# Patient Record
Sex: MALE | Race: WHITE | NOT HISPANIC OR LATINO | URBAN - METROPOLITAN AREA
[De-identification: names, ages, dates, MRNs, and addresses within clinical notes are randomized per-mention and may not be internally consistent; named-entity substitution may affect disease eponyms.]

---

## 2019-11-11 ENCOUNTER — EMERGENCY (EMERGENCY)
Facility: HOSPITAL | Age: 24
LOS: 0 days | Discharge: HOME | End: 2019-11-11
Attending: EMERGENCY MEDICINE | Admitting: EMERGENCY MEDICINE
Payer: MEDICAID

## 2019-11-11 VITALS
TEMPERATURE: 98 F | RESPIRATION RATE: 18 BRPM | HEART RATE: 85 BPM | SYSTOLIC BLOOD PRESSURE: 141 MMHG | DIASTOLIC BLOOD PRESSURE: 82 MMHG | OXYGEN SATURATION: 100 %

## 2019-11-11 VITALS
RESPIRATION RATE: 18 BRPM | DIASTOLIC BLOOD PRESSURE: 78 MMHG | SYSTOLIC BLOOD PRESSURE: 144 MMHG | HEART RATE: 95 BPM | TEMPERATURE: 98 F | WEIGHT: 190.04 LBS | OXYGEN SATURATION: 100 %
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
## 2019-11-11 DIAGNOSIS — S91.312A LACERATION WITHOUT FOREIGN BODY, LEFT FOOT, INITIAL ENCOUNTER: ICD-10-CM

## 2019-11-11 DIAGNOSIS — R20.0 ANESTHESIA OF SKIN: ICD-10-CM

## 2019-11-11 PROCEDURE — 73620 X-RAY EXAM OF FOOT: CPT | Mod: 26,LT

## 2019-11-11 PROCEDURE — 99283 EMERGENCY DEPT VISIT LOW MDM: CPT

## 2019-11-11 PROCEDURE — 73630 X-RAY EXAM OF FOOT: CPT | Mod: 26,LT

## 2019-11-11 RX ORDER — TETANUS TOXOID, REDUCED DIPHTHERIA TOXOID AND ACELLULAR PERTUSSIS VACCINE, ADSORBED 5; 2.5; 8; 8; 2.5 [IU]/.5ML; [IU]/.5ML; UG/.5ML; UG/.5ML; UG/.5ML
0.5 SUSPENSION INTRAMUSCULAR ONCE
Refills: 0 | Status: COMPLETED | OUTPATIENT
Start: 2019-11-11 | End: 2019-11-11

## 2019-11-11 RX ORDER — CEPHALEXIN 500 MG
1 CAPSULE ORAL
Qty: 28 | Refills: 0
Start: 2019-11-11 | End: 2019-11-17

## 2019-11-11 RX ADMIN — TETANUS TOXOID, REDUCED DIPHTHERIA TOXOID AND ACELLULAR PERTUSSIS VACCINE, ADSORBED 0.5 MILLILITER(S): 5; 2.5; 8; 8; 2.5 SUSPENSION INTRAMUSCULAR at 11:27

## 2019-11-11 NOTE — ED PROVIDER NOTE - OBJECTIVE STATEMENT
patient c/o laceration to left lateral foot. Glass bottle fell breaking on foot last night, C/o laceration with numbness to foot distal to laceration.

## 2019-11-11 NOTE — ED PROVIDER NOTE - PATIENT PORTAL LINK FT
You can access the FollowMyHealth Patient Portal offered by [facility] by registering at the following website: [email or web address]. By joining [employer]’s FollowMyHealth portal, you will also be able to view your health information using other applications (apps) compatible with our system.

## 2019-11-11 NOTE — ED PROVIDER NOTE - CARE PROVIDER_API CALL
Moe Mcintyre (DPM)  [facility]  [address], 1st [address]  [place] [address]  Phone: [phone]  Fax: [phone]  Follow Up Time:

## 2019-11-11 NOTE — ED PROVIDER NOTE - DISCHARGE DATE
Called pt left message to return call.  Last office visit with Dr. Leija was on 08-01-16 at which time Dr. Leija advised Tequila to return to clinic for f/u in 3 months.    Mira House  [facility] RN  
Msg over 1 week.  Has not called back to make any follow up appt.  Will close request. Can recreate encounter if patient call to make appt.  Qian Rodriguez RN  [facility]      
Propranolol        Last Written Prescription Date: 03-17-17  Last Fill Quantity: 60, # refills: 5    Last Office Visit with G, P or [facility] prescribing provider:  08-01-16   Future Office Visit:        BP Readings from Last 3 Encounters:   05/22/17 109/77   01/17/17 108/71   11/30/16 108/57     Mira House  [facility] RN  
Pt notified of below.  Pt reports understanding.  Pt does not have further questions or concerns.  Patient will check her schedule and call back for an appointment.  Patient will notify care team when appointment is scheduled so refill can be generated.    Makayla Min   [facility]  RN    
Requesting refill of Propranolol 60 mg # 60  
11-Nov-2019

## 2019-11-11 NOTE — ED PROVIDER NOTE - ATTENDING CONTRIBUTION TO CARE
lac to lateral foot.  wound is overlying the sural nerve.  VS noted.  there is sensory deficit distal to the wound on the dorsum and lateral aspect of the foot.  Xray reviewed.  No fb seen. closure supervised.  d/w podiatry.

## 2019-11-11 NOTE — ED PROVIDER NOTE - CLINICAL SUMMARY MEDICAL DECISION MAKING FREE TEXT BOX
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  d/w podiatry: advised primary wound closure.  f/u arranged.

## 2019-11-11 NOTE — ED PROVIDER NOTE - MUSCULOSKELETAL MINIMAL EXAM
left foot shows 1 cm laceration to lateral aspect at base 5th metatarsal.   Numbness noted to lateral foot extending to 5th toe, FROM, no weakness.

## 2019-11-25 ENCOUNTER — EMERGENCY (EMERGENCY)
Facility: HOSPITAL | Age: 24
LOS: 0 days | Discharge: HOME | End: 2019-11-25
Attending: EMERGENCY MEDICINE | Admitting: EMERGENCY MEDICINE

## 2019-11-25 VITALS
OXYGEN SATURATION: 99 % | WEIGHT: 199.96 LBS | RESPIRATION RATE: 18 BRPM | HEIGHT: 72 IN | HEART RATE: 89 BPM | SYSTOLIC BLOOD PRESSURE: 163 MMHG | TEMPERATURE: 98 F | DIASTOLIC BLOOD PRESSURE: 67 MMHG
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
## 2019-11-25 DIAGNOSIS — S91.312D LACERATION WITHOUT FOREIGN BODY, LEFT FOOT, SUBSEQUENT ENCOUNTER: ICD-10-CM

## 2019-11-25 PROBLEM — Z78.9 OTHER SPECIFIED HEALTH STATUS: Chronic | Status: ACTIVE | Noted: 2019-11-11

## 2019-11-25 NOTE — ED PROVIDER NOTE - OBJECTIVE STATEMENT
25 yo male, no pmh, presents to ed for suture removal. pt states 2 weeks ago sustained lac to left lateral foot from beer glass. No complications, mild. Denies fever, drainage, redness, pain, limited rom, numbness, tingling.

## 2019-11-25 NOTE — ED ADULT NURSE NOTE - NSIMPLEMENTINTERV_GEN_ALL_ED
Implemented All Universal Safety Interventions:  [place] to call system. Call bell, personal items and telephone within reach. Instruct patient to call for assistance. Room bathroom lighting operational. Non-slip footwear when patient is off stretcher. Physically safe environment: no spills, clutter or unnecessary equipment. Stretcher in lowest position, wheels locked, appropriate side rails in place.

## 2019-11-25 NOTE — ED PROVIDER NOTE - NS ED ROS FT
Constitutional: (-) fever  Musculoskeletal: (-) limited rom  Integumentary: (-) rash  Neurological: (-) tingling, (-)numbness,

## 2019-11-25 NOTE — ED PROVIDER NOTE - PHYSICAL EXAMINATION
Physical Exam    Vital Signs: I have reviewed the initial vital signs.  Constitutional: well-nourished, appears stated age, no acute distress  Eyes: Conjunctiva pink, Sclera clear  Musculoskeletal: from of left ankle, no ttp over suture site  Integumentary: wound intact, sutures in placed, healed well  Neurologic: awake, alert, nvi

## 2019-11-25 NOTE — ED PROVIDER NOTE - PATIENT PORTAL LINK FT
You can access the FollowMyHealth Patient Portal offered by [facility] by registering at the following website: [email or web address]. By joining [employer]’s FollowMyHealth portal, you will also be able to view your health information using other applications (apps) compatible with our system.

## 2022-05-15 NOTE — ED ADULT NURSE NOTE - CAS EDN DISCHARGE ASSESSMENT
05/15/22                            Benitez Clay  [id number] [facility] [id number]    To Whom It May Concern:    This is to certify Benitez Clay was evaluated with ZACHARY Juarez on 05/15/22 and can return to regular work on 5/18/20222, or sooner if better.     RESTRICTIONS: none              ZACHARY Juarez  [address] [address]  Phone: [phone]  Fax: [phone]    
Alert and oriented to person, place and time

## 2024-01-01 NOTE — ED PROVIDER NOTE - PRINCIPAL DIAGNOSIS
Tu prueba de embarazo es positiva. Es necesario realizar un seguimiento con un obstetra.  
Visit for suture removal
AAO1 person

## 2025-07-07 ENCOUNTER — INPATIENT (INPATIENT)
Facility: HOSPITAL | Age: 30
LOS: 0 days | Discharge: HOME | DRG: 58 | End: 2025-07-08
Admitting: STUDENT IN AN ORGANIZED HEALTH CARE EDUCATION/TRAINING PROGRAM
Payer: MEDICAID

## 2025-07-07 VITALS
DIASTOLIC BLOOD PRESSURE: 91 MMHG | SYSTOLIC BLOOD PRESSURE: 141 MMHG | TEMPERATURE: 99 F | OXYGEN SATURATION: 99 % | HEART RATE: 89 BPM | RESPIRATION RATE: 20 BRPM

## 2025-07-07 DIAGNOSIS — R93.0 ABNORMAL FINDINGS ON DIAGNOSTIC IMAGING OF SKULL AND HEAD, NOT ELSEWHERE CLASSIFIED: ICD-10-CM

## 2025-07-07 LAB
ALBUMIN SERPL ELPH-MCNC: 4.8 G/DL — SIGNIFICANT CHANGE UP (ref 3.5–5.2)
ALP SERPL-CCNC: 96 U/L — SIGNIFICANT CHANGE UP (ref 30–115)
ALT FLD-CCNC: 25 U/L — SIGNIFICANT CHANGE UP (ref 0–41)
ANION GAP SERPL CALC-SCNC: 15 MMOL/L — HIGH (ref 7–14)
AST SERPL-CCNC: 26 U/L — SIGNIFICANT CHANGE UP (ref 0–41)
BASOPHILS # BLD AUTO: 0.06 K/UL — SIGNIFICANT CHANGE UP (ref 0–0.2)
BASOPHILS NFR BLD AUTO: 1.1 % — SIGNIFICANT CHANGE UP (ref 0–2)
BILIRUB SERPL-MCNC: 0.9 MG/DL — SIGNIFICANT CHANGE UP (ref 0.2–1.2)
BUN SERPL-MCNC: 15 MG/DL — SIGNIFICANT CHANGE UP (ref 10–20)
CALCIUM SERPL-MCNC: 9.5 MG/DL — SIGNIFICANT CHANGE UP (ref 8.4–10.5)
CHLORIDE SERPL-SCNC: 100 MMOL/L — SIGNIFICANT CHANGE UP (ref 98–110)
CHOLEST SERPL-MCNC: 205 MG/DL — HIGH
CO2 SERPL-SCNC: 22 MMOL/L — SIGNIFICANT CHANGE UP (ref 17–32)
CREAT SERPL-MCNC: 1.1 MG/DL — SIGNIFICANT CHANGE UP (ref 0.7–1.5)
EGFR: 92 ML/MIN/1.73M2 — SIGNIFICANT CHANGE UP
EGFR: 92 ML/MIN/1.73M2 — SIGNIFICANT CHANGE UP
EOSINOPHIL # BLD AUTO: 0.24 K/UL — SIGNIFICANT CHANGE UP (ref 0–0.5)
EOSINOPHIL NFR BLD AUTO: 4.2 % — SIGNIFICANT CHANGE UP (ref 0–6)
GLUCOSE SERPL-MCNC: 102 MG/DL — HIGH (ref 70–99)
HCT VFR BLD CALC: 45.3 % — SIGNIFICANT CHANGE UP (ref 39–50)
HDLC SERPL-MCNC: 42 MG/DL — SIGNIFICANT CHANGE UP
HGB BLD-MCNC: 15.3 G/DL — SIGNIFICANT CHANGE UP (ref 13–17)
IMM GRANULOCYTES # BLD AUTO: 0.02 K/UL — SIGNIFICANT CHANGE UP (ref 0–0.07)
IMM GRANULOCYTES NFR BLD AUTO: 0.4 % — SIGNIFICANT CHANGE UP (ref 0–0.9)
LDLC SERPL-MCNC: 126 MG/DL — HIGH
LIPID PNL WITH DIRECT LDL SERPL: 126 MG/DL — HIGH
LYMPHOCYTES # BLD AUTO: 1.55 K/UL — SIGNIFICANT CHANGE UP (ref 1–3.3)
LYMPHOCYTES NFR BLD AUTO: 27.1 % — SIGNIFICANT CHANGE UP (ref 13–44)
MCHC RBC-ENTMCNC: 29.9 PG — SIGNIFICANT CHANGE UP (ref 27–34)
MCHC RBC-ENTMCNC: 33.8 G/DL — SIGNIFICANT CHANGE UP (ref 32–36)
MCV RBC AUTO: 88.5 FL — SIGNIFICANT CHANGE UP (ref 80–100)
MONOCYTES # BLD AUTO: 0.63 K/UL — SIGNIFICANT CHANGE UP (ref 0–0.9)
MONOCYTES NFR BLD AUTO: 11 % — SIGNIFICANT CHANGE UP (ref 2–14)
NEUTROPHILS # BLD AUTO: 3.21 K/UL — SIGNIFICANT CHANGE UP (ref 1.8–7.4)
NEUTROPHILS NFR BLD AUTO: 56.2 % — SIGNIFICANT CHANGE UP (ref 43–77)
NONHDLC SERPL-MCNC: 163 MG/DL — HIGH
NRBC # BLD AUTO: 0 K/UL — SIGNIFICANT CHANGE UP (ref 0–0)
NRBC # FLD: 0 K/UL — SIGNIFICANT CHANGE UP (ref 0–0)
NRBC BLD AUTO-RTO: 0 /100 WBCS — SIGNIFICANT CHANGE UP (ref 0–0)
PLATELET # BLD AUTO: 341 K/UL — SIGNIFICANT CHANGE UP (ref 150–400)
PMV BLD: 9.1 FL — SIGNIFICANT CHANGE UP (ref 7–13)
POTASSIUM SERPL-MCNC: 4.5 MMOL/L — SIGNIFICANT CHANGE UP (ref 3.5–5)
POTASSIUM SERPL-SCNC: 4.5 MMOL/L — SIGNIFICANT CHANGE UP (ref 3.5–5)
PROT SERPL-MCNC: 7.4 G/DL — SIGNIFICANT CHANGE UP (ref 6–8)
RBC # BLD: 5.12 M/UL — SIGNIFICANT CHANGE UP (ref 4.2–5.8)
RBC # FLD: 12 % — SIGNIFICANT CHANGE UP (ref 10.3–14.5)
SODIUM SERPL-SCNC: 137 MMOL/L — SIGNIFICANT CHANGE UP (ref 135–146)
TRIGL SERPL-MCNC: 208 MG/DL — HIGH
WBC # BLD: 5.71 K/UL — SIGNIFICANT CHANGE UP (ref 3.8–10.5)
WBC # FLD AUTO: 5.71 K/UL — SIGNIFICANT CHANGE UP (ref 3.8–10.5)

## 2025-07-07 PROCEDURE — A9579: CPT

## 2025-07-07 PROCEDURE — 70551 MRI BRAIN STEM W/O DYE: CPT

## 2025-07-07 PROCEDURE — 70498 CT ANGIOGRAPHY NECK: CPT | Mod: 26

## 2025-07-07 PROCEDURE — 99285 EMERGENCY DEPT VISIT HI MDM: CPT

## 2025-07-07 PROCEDURE — 70450 CT HEAD/BRAIN W/O DYE: CPT | Mod: 26,59

## 2025-07-07 PROCEDURE — 70551 MRI BRAIN STEM W/O DYE: CPT | Mod: 26,59

## 2025-07-07 PROCEDURE — 70496 CT ANGIOGRAPHY HEAD: CPT | Mod: 26

## 2025-07-07 PROCEDURE — 70546 MR ANGIOGRAPH HEAD W/O&W/DYE: CPT | Mod: 26

## 2025-07-07 PROCEDURE — 36415 COLL VENOUS BLD VENIPUNCTURE: CPT

## 2025-07-07 PROCEDURE — 93306 TTE W/DOPPLER COMPLETE: CPT

## 2025-07-07 PROCEDURE — 70546 MR ANGIOGRAPH HEAD W/O&W/DYE: CPT

## 2025-07-07 RX ORDER — KETOROLAC TROMETHAMINE 30 MG/ML
15 INJECTION, SOLUTION INTRAMUSCULAR; INTRAVENOUS EVERY 6 HOURS
Refills: 0 | Status: DISCONTINUED | OUTPATIENT
Start: 2025-07-07 | End: 2025-07-08

## 2025-07-07 RX ORDER — ACETAMINOPHEN 500 MG/5ML
650 LIQUID (ML) ORAL EVERY 6 HOURS
Refills: 0 | Status: DISCONTINUED | OUTPATIENT
Start: 2025-07-07 | End: 2025-07-08

## 2025-07-07 RX ORDER — METOCLOPRAMIDE HCL 10 MG
10 TABLET ORAL EVERY 6 HOURS
Refills: 0 | Status: DISCONTINUED | OUTPATIENT
Start: 2025-07-07 | End: 2025-07-08

## 2025-07-07 RX ORDER — ONDANSETRON HCL/PF 4 MG/2 ML
4 VIAL (ML) INJECTION EVERY 8 HOURS
Refills: 0 | Status: DISCONTINUED | OUTPATIENT
Start: 2025-07-07 | End: 2025-07-08

## 2025-07-07 RX ORDER — MAGNESIUM, ALUMINUM HYDROXIDE 200-200 MG
30 TABLET,CHEWABLE ORAL EVERY 4 HOURS
Refills: 0 | Status: DISCONTINUED | OUTPATIENT
Start: 2025-07-07 | End: 2025-07-08

## 2025-07-07 RX ORDER — MELATONIN 5 MG
3 TABLET ORAL AT BEDTIME
Refills: 0 | Status: DISCONTINUED | OUTPATIENT
Start: 2025-07-07 | End: 2025-07-08

## 2025-07-07 NOTE — H&P ADULT - NSHPPHYSICALEXAM_GEN_ALL_CORE
VITALS:   T(C): 37.2 (07-07-25 @ 10:32), Max: 37.2 (07-07-25 @ 10:32)  HR: 64 (07-07-25 @ 15:04) (64 - 89)  BP: 143/77 (07-07-25 @ 15:04) (141/91 - 143/77)  RR: 20 (07-07-25 @ 15:04) (20 - 20)  SpO2: 100% (07-07-25 @ 15:04) (99% - 100%)    GENERAL: NAD, lying in bed comfortably  HEAD:  Atraumatic, Normocephalic  EYES: EOMI, PERRLA, conjunctiva and sclera clear  ENT: Moist mucous membranes  NECK: Supple, No JVD  CHEST/LUNG: Clear to auscultation bilaterally; No rales, rhonchi, wheezing, or rubs. Unlabored respirations  HEART: Regular rate and rhythm; No murmurs, rubs, or gallops  ABDOMEN: BSx4; Soft, nontender, nondistended  EXTREMITIES:  2+ Peripheral Pulses, brisk capillary refill. No clubbing, cyanosis, or edema  NERVOUS SYSTEM:  A&Ox3, no focal deficits   SKIN: No rashes or lesions VITALS:   T(C): 37.2 (07-07-25 @ 10:32), Max: 37.2 (07-07-25 @ 10:32)  HR: 64 (07-07-25 @ 15:04) (64 - 89)  BP: 143/77 (07-07-25 @ 15:04) (141/91 - 143/77)  RR: 20 (07-07-25 @ 15:04) (20 - 20)  SpO2: 100% (07-07-25 @ 15:04) (99% - 100%)    GENERAL: NAD, lying in bed comfortably  HEAD:  Atraumatic, Normocephalic  EYES: EOMI, PERRLA, conjunctiva and sclera clear  ENT: Moist mucous membranes  NECK: Supple, No JVD  CHEST/LUNG: Clear to auscultation bilaterally; No rales, rhonchi, wheezing, or rubs. Unlabored respirations  HEART: slight systolic murmur otherwise Regular rate and rhythm; No rubs, or gallops  ABDOMEN: BSx4; Soft, nontender, nondistended  EXTREMITIES:  2+ Peripheral Pulses, brisk capillary refill. No clubbing, cyanosis, or edema  NERVOUS SYSTEM:  A&Ox3, no focal deficits   SKIN: No rashes or lesions

## 2025-07-07 NOTE — H&P ADULT - NSHPLABSRESULTS_GEN_ALL_CORE
15.3   5.71  )-----------( 341      ( 07 Jul 2025 10:58 )             45.3   07-07    137  |  100  |  15  ----------------------------<  102[H]  4.5   |  22  |  1.1    Ca    9.5      07 Jul 2025 10:58    TPro  7.4  /  Alb  4.8  /  TBili  0.9  /  DBili  x   /  AST  26  /  ALT  25  /  AlkPhos  96  07-07      CT BRAIN:    There is no evidence for acute intracranial hemorrhage, mass effect or   midline shift.    The basal cisterns are patent. There is no hydrocephalus.    There is no extra-axial collection.    The visualized orbits are unremarkable.    The calvarium is intact, without depressed fracture.    The visualized paranasal sinuses and mastoid air cells are clear.    HEAD CTA:    The internal carotid arteries demonstrate normal enhancement to the   intracranial circulation and [place].    There is moderate focal stenosis noted of the proximal right A2 segment   of the right MARYAM, series 605 image 71. The distal ACAs appear patent.    The middle cerebral arteries demonstrate normal enhancement and symmetric   arborization without intraluminal filling defect, stenosis, occlusion,   aneurysm or vascular malformation.    The intradural vertebral arteries demonstrate normal enhancement to the   vertebrobasilar confluence. Branch vasculature of the posterior   circulation are within normal limits. The posterior cerebral arteries   demonstrate symmetric enhancement and arborization without evidence for   aneurysm, stenosis, occlusion or vascular malformation.    Visualized dural venous sinuses and deep cerebral venous structures   demonstrate normal enhancement without evidence for filling defect.    NECK CTA:    The aortic arch and origins of the great vessels are within normal limits.    Right:  The origin of the right common carotid artery is normal. The   right common carotid artery is normal in course and caliber to the  carotid bifurcation. Origins of the internal and external carotid   arteries are normal by NASCET criteria. The right internal carotid artery   has normal course and caliber to the intracranial circulation.    The origin of the right vertebral artery is normal. The right vertebral   artery is normal in course and caliber to the intracranial circulation.    Left: The origin of the left common carotid artery is normal. The left   common carotid artery is normal in course and caliber to the carotid   bifurcation. Origins of the internal and external carotid arteries are   normal by NASCET criteria. The left internal carotid artery has normal   course and caliber to the intracranial circulation.    The origin of the left vertebral artery is normal. The left vertebral   artery is normal in course and caliber to the intracranial circulation.    IMPRESSION:  CT HEAD:  No acute intracranial pathology. No evidence of midline shift, mass   effect or intracranial hemorrhage.    CTA HEAD:  Moderatefocal stenosis of the proximal right A2 segment of the MARYAM.    Intracranial vessels are otherwise patent and unremarkable.    CTA NECK:  No evidence of carotid or vertebral artery stenosis.    --- End of Report ---

## 2025-07-07 NOTE — PATIENT PROFILE ADULT - FALL HARM RISK - UNIVERSAL INTERVENTIONS
Bed in lowest position, wheels locked, appropriate side rails in place/Call bell, personal items and telephone in reach/Instruct patient to call for assistance before getting out of bed or chair/Non-slip footwear when patient is out of bed/[place] to call system/Physically safe environment - no spills, clutter or unnecessary equipment/Purposeful Proactive Rounding/Room/bathroom lighting operational, light cord in reach

## 2025-07-07 NOTE — ED PROVIDER NOTE - OBJECTIVE STATEMENT
29-year-old male with no past medical history presenting for evaluation of an exertional headache that has been occurring over the past 1 week.  Patient also describes being in a "fog".  No dizziness, or visual changes, paresthesias, weakness, fevers or chills.

## 2025-07-07 NOTE — ED ADULT NURSE NOTE - NSFALLUNIVINTERV_ED_ALL_ED
Bed/Stretcher in lowest position, wheels locked, appropriate side rails in place/Call bell, personal items and telephone in reach/Instruct patient to call for assistance before getting out of bed/chair/stretcher/Non-slip footwear applied when patient is off stretcher/[place] to call system/Physically safe environment - no spills, clutter or unnecessary equipment/Purposeful proactive rounding/Room/bathroom lighting operational, light cord in reach

## 2025-07-07 NOTE — H&P ADULT - ASSESSMENT
#Headache  #Focal stenosis of right A2 segment of MARYAM  - Admit to med/surg  - Neurology consult  - F/up MRI Brain   - Tylenol, toradol, reglan prn for headahce symptoms  30yo M with no significant PMHx who presents with an exertional headache x 1 week. Unclear etiology of symptoms given exertional nature. No obvious medication side effects and denies recreational drug use. No history or family history to explain symptoms. Will eval further with MR/MRA imaging and consult with neurology    #Headache  #Focal stenosis of right A2 segment of MARYAM  - Admit to med/surg  - Neurology consult  - F/up MRI Brain w/o and MRA Head w/w/o  - Tylenol, toradol, reglan prn for headahce symptoms  28yo M with no significant PMHx who presents with an exertional headache x 1 week. Unclear etiology of symptoms given exertional nature. No obvious medication side effects and denies recreational drug use. No history or family history to explain symptoms. Will eval further with MR/MRA imaging and consult with neurology    #Headache  #Focal stenosis of right A2 segment of MARYAM  - Admit to med/surg  - Neurology consult  - F/up MRI Brain w/o and MRA Head w/w/o  - Check lipid panel, A1c, TSH  - Tylenol, toradol, reglan prn for headahce symptoms  30yo M with no significant PMHx who presents with an exertional headache x 1 week. Unclear etiology of symptoms given exertional nature. No obvious medication side effects and denies recreational drug use. No history or family history to explain symptoms. Will eval further with MR/MRA imaging and consult with neurology    #Headache  #Focal stenosis of right A2 segment of MARYAM  - Admit to med/surg  - Neurology consult  - F/up MRI Brain w/o and MRA Head w/w/o  - Check lipid panel, A1c, TSH  - Obtain TTE  - Tylenol, toradol, reglan prn for headahce symptoms

## 2025-07-07 NOTE — H&P ADULT - HISTORY OF PRESENT ILLNESS
28yo M with no significant PMHx who presents with an exertional headache x 1 week.  30yo M with no significant PMHx who presents with an exertional headache x 1 week. Patient reports new onset of frontal headache only when exercising. Headache has been associated with nausea, and "fuzziness" in his head. Has never experienced symptoms like these in the past. Does not have headache at rest. No other focal neurological symptoms. Denies vision changes, hearing changes, dizziness, lightheadedness, weakness, numbness, tingling, chest pain, palpitations. States he is very active. No prescribed moods, only supplements (B12, creatine, collagen, element T, fish oil). States he was told he had high cholesterol in the past. No family history of similar symptoms. Denies alcohol, tobacco or drug use.

## 2025-07-07 NOTE — ED PROVIDER NOTE - CLINICAL SUMMARY MEDICAL DECISION MAKING FREE TEXT BOX
29-year-old male here for evaluation of exertional headache for the past 2 days no focal weakness numbness tingling.  Here on exam patient no distress current nerves II through XII intact motor and sensation grossly intact normal gait.  Here CT imaging shows moderate focal stenosis of the right A2 segment of the MARYAM.  Neurology was consulted and recommended MRI of the brain.

## 2025-07-08 ENCOUNTER — TRANSCRIPTION ENCOUNTER (OUTPATIENT)
Age: 30
End: 2025-07-08

## 2025-07-08 ENCOUNTER — RESULT REVIEW (OUTPATIENT)
Age: 30
End: 2025-07-08

## 2025-07-08 VITALS
SYSTOLIC BLOOD PRESSURE: 123 MMHG | RESPIRATION RATE: 18 BRPM | DIASTOLIC BLOOD PRESSURE: 66 MMHG | HEART RATE: 62 BPM | OXYGEN SATURATION: 99 % | TEMPERATURE: 98 F

## 2025-07-08 LAB
A1C WITH ESTIMATED AVERAGE GLUCOSE RESULT: 5.1 % — SIGNIFICANT CHANGE UP (ref 4–5.6)
ESTIMATED AVERAGE GLUCOSE: 100 MG/DL — SIGNIFICANT CHANGE UP (ref 68–114)
TSH SERPL-MCNC: 2.17 UIU/ML — SIGNIFICANT CHANGE UP (ref 0.27–4.2)

## 2025-07-08 PROCEDURE — 99222 1ST HOSP IP/OBS MODERATE 55: CPT

## 2025-07-08 PROCEDURE — 93306 TTE W/DOPPLER COMPLETE: CPT | Mod: 26

## 2025-07-08 RX ORDER — ROSUVASTATIN CALCIUM 20 MG/1
1 TABLET, FILM COATED ORAL
Qty: 30 | Refills: 0
Start: 2025-07-08

## 2025-07-08 NOTE — DISCHARGE NOTE NURSING/CASE MANAGEMENT/SOCIAL WORK - NSDCVIVACCINE_GEN_ALL_CORE_FT
Tdap; 11-Nov-2019 11:27; Tavo Rose (RN); [employer]; [id number] (Exp. Date: 22-Oct-2021); IntraMuscular; Dorsogluteal Left.; 0.5 milliLiter(s); VIS (VIS Published: 09-May-2013, VIS Presented: 11-Nov-2019);

## 2025-07-08 NOTE — CONSULT NOTE ADULT - NS ATTEND AMEND GEN_ALL_CORE FT
Patient seen and examined and agree with above except as noted.  Patients history, notes, labs, imaging, vitals and meds reviewed personally.    Plan as above  MRI brain and MRA results discussed   recommended followup as out patient  As MARYAM is asymptomatic will hold off AP given his age and no other risk factors  Discussed hydration and to hold additional exercise supplements which he started recently    Plan as above

## 2025-07-08 NOTE — DISCHARGE NOTE NURSING/CASE MANAGEMENT/SOCIAL WORK - FINANCIAL ASSISTANCE
[facility] provides services at a reduced cost to those who are determined to be eligible through [facility]’s financial assistance program. Information regarding [facility]’s financial assistance program can be found by going to [email or web address] or by calling [phone].

## 2025-07-08 NOTE — CONSULT NOTE ADULT - SUBJECTIVE AND OBJECTIVE BOX
NEUROLOGY CONSULT    HPI: 30yo RHM PMH unremarkable (but has not seen PCP in years) presented to ED for exertional headache over the last week. He notes last Sunday, 9 days ago, he noted he wasn't drinking a lot of water and was feeling a little "funny". He thought he was just dehydration so he increased his water intake and stopped the creatine supplement he was taking. On Tuesday, he was jumping rope when he noticed a frontal headache. Once he stopped jumping rope, it resolved, and he was continue to the rest of his workout. The next day he was doing push ups, and around 20 reps he noticed a strong headache associated with nausea. Slight phonosensitivity as well, no photosensitivity. He took a Tylenol and laid down, and it resolved. The next few days he took a break. 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 He was easily distracted and had difficulty focusing. On Sunday night he did some slight exertion, and once again the headache came back prompting him to come to ED.     He has never had headaches before. No one in his family has headaches. No associated dizziness. No visual changes. Denies use of illicit drugs. He drinks socially. He does not see a PCP and has not been to a doctor "in years".       MEDICATIONS  Home Medications: supplements       MEDICATIONS  (PRN):  acetaminophen     Tablet .. 650 milliGRAM(s) Oral every 6 hours PRN Temp greater or equal to 38C (100.4F), Mild Pain (1 - 3)  aluminum hydroxide/magnesium hydroxide/simethicone Suspension 30 milliLiter(s) Oral every 4 hours PRN Dyspepsia  ketorolac   Injectable 15 milliGRAM(s) IV Push every 6 hours PRN Moderate Pain (4 - 6)  melatonin 3 milliGRAM(s) Oral at bedtime PRN Insomnia  metoclopramide 10 milliGRAM(s) Oral every 6 hours PRN Headache and or nausea  ondansetron Injectable 4 milliGRAM(s) IV Push every 8 hours PRN Nausea and/or Vomiting      FAMILY HISTORY: no cardiac/vascular issues. no migraines. Maternal grandmother vertigo.     SOCIAL HISTORY: social alcohol. Job is not physically strenuous     Allergies    No Known Allergies          GEN: NAD, pleasant, cooperative    NEURO:   MENTAL STATUS: AAOx3  LANG/SPEECH: Fluent, intact naming, repetition & comprehension  CRANIAL NERVES:  II: Pupils equal and reactive, no RAPD, normal visual fields  III, IV, VI: EOM intact, no gaze preference or deviation  V: normal  VII: no facial asymmetry  VIII: normal hearing to speech  MOTOR: 5/5 in both upper and lower extremities  REFLEXES: downgoing toes   SENSORY: Normal to light touch. No neglect   COORD: Normal finger to nose and heel to shin, no tremor, no dysmetria      LABS:                        15.3   5.71  )-----------( 341      ( 07 Jul 2025 10:58 )             45.3     07-07    137  |  100  |  15  ----------------------------<  102[H]  4.5   |  22  |  1.1    Ca    9.5      07 Jul 2025 10:58    TPro  7.4  /  Alb  4.8  /  TBili  0.9  /  DBili  x   /  AST  26  /  ALT  25  /  AlkPhos  96  07-07        Urinalysis Basic - ( 07 Jul 2025 10:58 )    Color: x / Appearance: x / SG: x / pH: x  Gluc: 102 mg/dL / Ketone: x  / Bili: x / Urobili: x   Blood: x / Protein: x / Nitrite: x   Leuk Esterase: x / RBC: x / WBC x   Sq Epi: x / Non Sq Epi: x / Bacteria: x          RADIOLOGY    < from: MR Head No Cont (07.07.25 @ 17:54) >  IMPRESSION:    No acute intracranial pathology.    Moderate right proximal A1-A2 segment stenosis.    --- End of Report ---    < end of copied text >        < from: CT Angio Neck w/ IV Cont (07.07.25 @ 12:02) >  IMPRESSION:  CT HEAD:  No acute intracranial pathology. No evidence of midline shift, mass   effect or intracranial hemorrhage.    CTA HEAD:  Moderatefocal stenosis of the proximal right A2 segment of the MARYAM.    Intracranial vessels are otherwise patent and unremarkable.    CTA NECK:  No evidence of carotid or vertebral artery stenosis.    --- End of Report ---    < end of copied text >

## 2025-07-08 NOTE — DISCHARGE NOTE NURSING/CASE MANAGEMENT/SOCIAL WORK - PATIENT PORTAL LINK FT
You can access the FollowMyHealth Patient Portal offered by [facility] by registering at the following website: [email or web address]. By joining [employer]’s FollowMyHealth portal, you will also be able to view your health information using other applications (apps) compatible with our system.

## 2025-07-08 NOTE — CONSULT NOTE ADULT - ASSESSMENT
30yo RHM PMH unremarkable (but has not seen PCP in years) presented to ED for exertional headache over the last week. CTH/MRI Brain reports on acute pathology. CTA/MRA Head/Neck reports moderate focal stenosis of proximal R A2 segment of MARYAM. On exam, no focal neuro deficits.   30yo RHM PMH unremarkable (but has not seen PCP in years) presented to ED for exertional headache over the last week. CTH/MRI Brain reports on acute pathology. CTA/MRA Head/Neck reports moderate focal stenosis of proximal R A2 segment of MARYAM. On exam, no focal neuro deficits. Etiology of headache possible dehydration/medical cause.    Recommendations  - No further inpatient neuro workup  - F/u stroke clinic in 1 year for incidental MARYAM stenosis   - No need for AP from neuro perspective  - Medical management per primary team    Discussed with attending Dr Vance

## 2025-07-08 NOTE — DISCHARGE NOTE NURSING/CASE MANAGEMENT/SOCIAL WORK - NSDCPEFALRISK_GEN_ALL_CORE
For information on Fall & Injury Prevention, visit: [email or web address] OR  [email or web address] OR  https://www.cdc.gov/steadi/patient.html

## 2025-07-08 NOTE — DISCHARGE NOTE PROVIDER - NSDCCPCAREPLAN_GEN_ALL_CORE_FT
PRINCIPAL DISCHARGE DIAGNOSIS  Diagnosis: Abnormal head CT  Assessment and Plan of Treatment: You came in for evaluation of a headache. You had a CT scan that showed a narrowing in a segment of one of your arteries. You were seen by neurology and a follow up MRI/MRA was completed. This also showed the same findings. Per neurology you should follow up in the stroke clinic in 1 year.      SECONDARY DISCHARGE DIAGNOSES  Diagnosis: HLD (hyperlipidemia)  Assessment and Plan of Treatment: Ypu were found to have high cholesterol. You should modify your fat consumption, exercise 4-5 times per week and start a low dose medication called Crestor. Follow up with Dr Kramer this Friday at 2PM

## 2025-07-08 NOTE — DISCHARGE NOTE PROVIDER - NSFOLLOWUPCLINICS_GEN_ALL_ED_FT
[facility]  Neurology  [address] [place] [address], [address]  [place] [address]  Phone: [phone]  Fax:

## 2025-07-08 NOTE — DISCHARGE NOTE PROVIDER - CARE PROVIDER_API CALL
Samuel Kramer  Internal Medicine  [address] [place] [address]  Phone: [phone]  Fax: [phone]  Follow Up Time: 1-3 days

## 2025-07-08 NOTE — DISCHARGE NOTE PROVIDER - HOSPITAL COURSE
28yo M with no significant PMHx who presents with an exertional headache x 1 week. Patient reports new onset of frontal headache only when exercising. Headache has been associated with nausea, and "fuzziness" in his head. Has never experienced symptoms like these in the past. Does not have headache at rest. No other focal neurological symptoms. Denies vision changes, hearing changes, dizziness, lightheadedness, weakness, numbness, tingling, chest pain, palpitations. States he is very active. No prescribed moods, only supplements (B12, creatine, collagen, element T, fish oil). States he was told he had high cholesterol in the past. No family history of similar symptoms. Denies alcohol, tobacco or drug use.     Pt had a CTA head and neck which showed a moderate focal stenosis of the right A2 segment of the MARYAM. Pt was admitted and Neuro eval was done. They requested an MRI/MRA be done. MRI/MRA again revealed focal stenosis of the A2 segment of the MARYAM. Neuro followed up and stated no further inpatient work up was necessary and pt could follow up in stroke clinic in 1 year.    Pt was found to have high cholesterol. Dr Kramer would like to start Crestor 10mg daily.    Pt is stable for DC and should follow up with Dr Kramer on Fri at 2pm in the office

## 2025-07-09 LAB
FOLATE SERPL-MCNC: 16.5 NG/ML — SIGNIFICANT CHANGE UP
VIT B12 SERPL-MCNC: 507 PG/ML — SIGNIFICANT CHANGE UP (ref 232–1245)

## 2025-07-11 DIAGNOSIS — I66.11 OCCLUSION AND STENOSIS OF RIGHT ANTERIOR CEREBRAL ARTERY: ICD-10-CM

## 2025-07-11 DIAGNOSIS — E78.5 HYPERLIPIDEMIA, UNSPECIFIED: ICD-10-CM

## 2025-07-14 ENCOUNTER — TRANSCRIPTION ENCOUNTER (OUTPATIENT)
Age: 30
End: 2025-07-14

## 2025-07-15 ENCOUNTER — APPOINTMENT (OUTPATIENT)
Dept: NEUROLOGY | Facility: CLINIC | Age: 30
End: 2025-07-15